# Patient Record
Sex: FEMALE | Race: WHITE | Employment: PART TIME | URBAN - METROPOLITAN AREA
[De-identification: names, ages, dates, MRNs, and addresses within clinical notes are randomized per-mention and may not be internally consistent; named-entity substitution may affect disease eponyms.]

---

## 2019-11-08 ENCOUNTER — AMBULATORY - RIVER FALLS (OUTPATIENT)
Dept: FAMILY MEDICINE | Facility: CLINIC | Age: 69
End: 2019-11-08

## 2022-02-16 NOTE — TELEPHONE ENCOUNTER
---------------------  From: Delmi Acevedo   To: Tad Chavis MD;     Sent: 11/7/2019 10:09:05 AM CST  Subject: Referral Management     Patient has a home sleep study uploaded into [employer] Ready for interpretation.

## 2022-10-17 ENCOUNTER — TRANSFERRED RECORDS (OUTPATIENT)
Dept: HEALTH INFORMATION MANAGEMENT | Facility: CLINIC | Age: 72
End: 2022-10-17

## 2022-10-17 LAB
ALT SERPL-CCNC: 17 U/L
AST SERPL-CCNC: 24 U/L (ref 14–36)
CHOLESTEROL (EXTERNAL): 266 MG/DL (ref 0–200)
CREATININE (EXTERNAL): 0.9 MG/DL (ref 0.7–1.4)
GLUCOSE (EXTERNAL): 92 MG/DL (ref 60–99)
HDLC SERPL-MCNC: 78 MG/DL (ref 35–65)
LDL CHOLESTEROL CALCULATED (EXTERNAL): 156 MG/DL (ref 0–130)
POTASSIUM (EXTERNAL): 4.2 MMOL/L (ref 3.5–5.1)
TRIGLYCERIDES (EXTERNAL): 158 MG/DL (ref 0–200)

## 2023-05-30 ENCOUNTER — MEDICAL CORRESPONDENCE (OUTPATIENT)
Dept: HEALTH INFORMATION MANAGEMENT | Facility: CLINIC | Age: 73
End: 2023-05-30
Payer: COMMERCIAL

## 2023-05-30 ENCOUNTER — TRANSFERRED RECORDS (OUTPATIENT)
Dept: HEALTH INFORMATION MANAGEMENT | Facility: CLINIC | Age: 73
End: 2023-05-30
Payer: COMMERCIAL

## 2023-06-19 ENCOUNTER — OFFICE VISIT (OUTPATIENT)
Dept: CARDIOLOGY | Facility: CLINIC | Age: 73
End: 2023-06-19
Payer: MEDICARE

## 2023-06-19 VITALS
DIASTOLIC BLOOD PRESSURE: 76 MMHG | HEIGHT: 64 IN | BODY MASS INDEX: 26.63 KG/M2 | WEIGHT: 156 LBS | SYSTOLIC BLOOD PRESSURE: 136 MMHG | HEART RATE: 68 BPM

## 2023-06-19 DIAGNOSIS — R94.31 ABNORMAL ELECTROCARDIOGRAM: Primary | ICD-10-CM

## 2023-06-19 DIAGNOSIS — G47.33 OSA (OBSTRUCTIVE SLEEP APNEA): ICD-10-CM

## 2023-06-19 PROCEDURE — 99204 OFFICE O/P NEW MOD 45 MIN: CPT | Performed by: INTERNAL MEDICINE

## 2023-06-19 RX ORDER — KETOROLAC TROMETHAMINE 5 MG/ML
1 SOLUTION OPHTHALMIC 4 TIMES DAILY
COMMUNITY

## 2023-06-19 RX ORDER — MOXIFLOXACIN 5 MG/ML
1 SOLUTION/ DROPS OPHTHALMIC 4 TIMES DAILY
COMMUNITY

## 2023-06-19 RX ORDER — EPINEPHRINE 0.3 MG/.3ML
0.3 INJECTION SUBCUTANEOUS
COMMUNITY
Start: 2022-10-17

## 2023-06-19 RX ORDER — PREDNISOLONE ACETATE 10 MG/ML
1 SUSPENSION/ DROPS OPHTHALMIC 4 TIMES DAILY
COMMUNITY

## 2023-06-19 NOTE — PROGRESS NOTES
[facility]   [address]  [place] [address]   [email or web address]   OFFICE: [phone]     CARDIOLOGY CLINIC NOTE     Thank you, Jennifer Corcoran, for asking the [facility] Heart Care team to see Ms. Annia Brasher to evaluate Consult         Assessment/Recommendations   Assessment:    1. Abnormal ECG - with right axis deviation. This is incidentally noted on a pre-op ECG. Annia has no current cardiorespiratory symptoms but does have mild untreated KIT.   2. KIT - mild as of 11/2019. Not currently treated.   3. Hyperlipidemia - with CAC of 0. I don't think she needs aggressive lipid management with her CAC.    Plan:  1. Evaluated cardiac size/function with an echocardiogram, looking at RV function and screening for pulmonary hypertension.  2. Encouraged regular exercise  3. Follow up as needed pending echo results.         History of Present Illness   Ms. Annia Brasher is a 72 year old female with a significant past history of hyperlipidemia who presents for evaluation of an abnormal ECG.    Mrs. Brasher recently had an EKG performed as part of a routine preoperative exam prior to cataract surgery.  The EKG demonstrated right axis deviation but was otherwise normal.  She is generally a very active person and gets intermittent aerobic exercise.  She denies any significant cardiorespiratory exertional symptoms.  She also denies palpitations.  She does have mild obstructive sleep apnea which was diagnosed in November 2019.  This is currently untreated.    Other than noted above, Ms. Brasher denies any chest pain/pressure/tightness, shortness of breath at rest or with exertion, light headedness/dizziness, pre-syncope, syncope, lower extremity swelling, palpitations, paroxysmal nocturnal dyspnea (PND), or orthopnea.     Cardiac Problems and Cardiac Diagnostics     Most Recent Cardiac testing:  ECG dated 5/30/23 (personaly reviewed and  "interpreted): mild sinus bradycardia (HR 56) with right axis deviation    CT cardiac calcium score 11/8/22  The patient's total coronary artery calcium score is 0  using the Agatston scoring method.        The estimated probability of a non-zero calcium score for a female age 70 is 59 %.          Medications  Allergies   Current Outpatient Medications   Medication Sig Dispense Refill     EPINEPHrine (ANY BX GENERIC EQUIV) 0.3 MG/0.3ML injection 2-pack Inject 0.3 mLs into the muscle once as needed for anaphylaxis       ketorolac (ACULAR) 0.5 % ophthalmic solution Place 1 drop into the right eye 4 times daily       moxifloxacin (VIGAMOX) 0.5 % ophthalmic solution Place 1 drop into the right eye 4 times daily       prednisoLONE acetate (PRED FORTE) 1 % ophthalmic suspension Place 1 drop into the right eye 4 times daily        Allergies   Allergen Reactions     Bees Anaphylaxis     Sulfa Antibiotics Rash        Physical Examination Review of Systems   Vitals: /76 (BP Location: Left arm, Patient Position: Sitting, Cuff Size: Adult Regular)   Pulse 68   Ht 1.626 m (5' 4\")   Wt 70.8 kg (156 lb)   BMI 26.78 kg/m    BMI= Body mass index is 26.78 kg/m .  Wt Readings from Last 3 Encounters:   06/19/23 70.8 kg (156 lb)       General: pleasant female. No acute distress.   HENT: external ears normal. Nares patent. Mucous membranes moist.  Eyes: perrla, extraocular muscles intact. No scleral icterus.   Neck: No JVD  Lungs: clear to auscultation  COR: regular rate and rhythm, No murmurs, rubs, or gallops  Abd: nondistended, BS present  Extrem: No edema        Please refer above for cardiac ROS details.       Past History   Past Medical History:   Past Medical History:   Diagnosis Date     Bilateral sensorineural hearing loss      KIT (obstructive sleep apnea)      Osteoarthritis         Past Surgical History:   Past Surgical History:   Procedure Laterality Date     COLONOSCOPY       EGD          Family History:   Family " History   Problem Relation Age of Onset     Alzheimer Disease Mother      Hypertension Mother      Heart Failure Father      Coronary Artery Disease Father         Social History:   Social History     Socioeconomic History     Marital status:      Spouse name: Not on file     Number of children: Not on file     Years of education: Not on file     Highest education level: Not on file   Occupational History     Not on file   Tobacco Use     Smoking status: Never     Smokeless tobacco: Never   Vaping Use     Vaping status: Not on file   Substance and Sexual Activity     Alcohol use: Not on file     Drug use: Never     Sexual activity: Not on file   Other Topics Concern     Not on file   Social History Narrative     Not on file     Social Determinants of Health     Financial Resource Strain: Not on file   Food Insecurity: Not on file   Transportation Needs: Not on file   Physical Activity: Not on file   Stress: Not on file   Social Connections: Not on file   Intimate Partner Violence: Not on file   Housing Stability: Not on file            Lab Results    Chemistry/lipid CBC Cardiac Enzymes/BNP/TSH/INR   No results found for: CHOL, HDL, TRIG, CHOLHDL, CREATININE, BUN, NA, CO2   Lipid panel at [facility] on 10/17/22  Total Chol - 266  HDL - 78  LDL - 156  Trigs - 158   No results found for: WBC, HGB, HCT, MCV, PLT No results found for: CKTOTAL, CKMB, TROPONINI, BNP, TSH, INR

## 2023-06-19 NOTE — LETTER
6/19/2023    Jennifer Green MD  [facility] [address] [place] [id number]    RE: Annia Brasher       Dear Colleague,     I had the pleasure of seeing Annia Brasher in the [facility].    [facility]   [address]  [place] [address]   [email or web address]   OFFICE: [phone]     CARDIOLOGY CLINIC NOTE     Thank you, Dr. Green, Jennifer PEPE, for asking the [facility] Heart Care team to see Ms. Annia Brasher to evaluate Consult         Assessment/Recommendations   Assessment:    Abnormal ECG - with right axis deviation. This is incidentally noted on a pre-op ECG. Annia has no current cardiorespiratory symptoms but does have mild untreated KIT.   KIT - mild as of 11/2019. Not currently treated.   Hyperlipidemia - with CAC of 0. I don't think she needs aggressive lipid management with her CAC.    Plan:  Evaluated cardiac size/function with an echocardiogram, looking at RV function and screening for pulmonary hypertension.  Encouraged regular exercise  Follow up as needed pending echo results.         History of Present Illness   Ms. Annia Brasher is a 72 year old female with a significant past history of hyperlipidemia who presents for evaluation of an abnormal ECG.    Mrs. Brasher recently had an EKG performed as part of a routine preoperative exam prior to cataract surgery.  The EKG demonstrated right axis deviation but was otherwise normal.  She is generally a very active person and gets intermittent aerobic exercise.  She denies any significant cardiorespiratory exertional symptoms.  She also denies palpitations.  She does have mild obstructive sleep apnea which was diagnosed in November 2019.  This is currently untreated.    Other than noted above, Ms. Brasher denies any chest pain/pressure/tightness, shortness of breath at rest or with exertion, light headedness/dizziness, pre-syncope, syncope, lower  "extremity swelling, palpitations, paroxysmal nocturnal dyspnea (PND), or orthopnea.     Cardiac Problems and Cardiac Diagnostics     Most Recent Cardiac testing:  ECG dated 5/30/23 (personaly reviewed and interpreted): mild sinus bradycardia (HR 56) with right axis deviation    CT cardiac calcium score 11/8/22  The patient's total coronary artery calcium score is 0  using the Agatston scoring method.        The estimated probability of a non-zero calcium score for a female age 70 is 59 %.          Medications  Allergies   Current Outpatient Medications   Medication Sig Dispense Refill    EPINEPHrine (ANY BX GENERIC EQUIV) 0.3 MG/0.3ML injection 2-pack Inject 0.3 mLs into the muscle once as needed for anaphylaxis      ketorolac (ACULAR) 0.5 % ophthalmic solution Place 1 drop into the right eye 4 times daily      moxifloxacin (VIGAMOX) 0.5 % ophthalmic solution Place 1 drop into the right eye 4 times daily      prednisoLONE acetate (PRED FORTE) 1 % ophthalmic suspension Place 1 drop into the right eye 4 times daily        Allergies   Allergen Reactions    Bees Anaphylaxis    Sulfa Antibiotics Rash        Physical Examination Review of Systems   Vitals: /76 (BP Location: Left arm, Patient Position: Sitting, Cuff Size: Adult Regular)   Pulse 68   Ht 1.626 m (5' 4\")   Wt 70.8 kg (156 lb)   BMI 26.78 kg/m    BMI= Body mass index is 26.78 kg/m .  Wt Readings from Last 3 Encounters:   06/19/23 70.8 kg (156 lb)       General: pleasant female. No acute distress.   HENT: external ears normal. Nares patent. Mucous membranes moist.  Eyes: perrla, extraocular muscles intact. No scleral icterus.   Neck: No JVD  Lungs: clear to auscultation  COR: regular rate and rhythm, No murmurs, rubs, or gallops  Abd: nondistended, BS present  Extrem: No edema        Please refer above for cardiac ROS details.       Past History   Past Medical History:   Past Medical History:   Diagnosis Date    Bilateral sensorineural hearing loss  "    KIT (obstructive sleep apnea)     Osteoarthritis         Past Surgical History:   Past Surgical History:   Procedure Laterality Date    COLONOSCOPY      EGD          Family History:   Family History   Problem Relation Age of Onset    Alzheimer Disease Mother     Hypertension Mother     Heart Failure Father     Coronary Artery Disease Father         Social History:   Social History     Socioeconomic History    Marital status:      Spouse name: Not on file    Number of children: Not on file    Years of education: Not on file    Highest education level: Not on file   Occupational History    Not on file   Tobacco Use    Smoking status: Never    Smokeless tobacco: Never   Vaping Use    Vaping status: Not on file   Substance and Sexual Activity    Alcohol use: Not on file    Drug use: Never    Sexual activity: Not on file   Other Topics Concern    Not on file   Social History Narrative    Not on file     Social Determinants of Health     Financial Resource Strain: Not on file   Food Insecurity: Not on file   Transportation Needs: Not on file   Physical Activity: Not on file   Stress: Not on file   Social Connections: Not on file   Intimate Partner Violence: Not on file   Housing Stability: Not on file            Lab Results    Chemistry/lipid CBC Cardiac Enzymes/BNP/TSH/INR   No results found for: CHOL, HDL, TRIG, CHOLHDL, CREATININE, BUN, NA, CO2   Lipid panel at [facility] on 10/17/22  Total Chol - 266  HDL - 78  LDL - 156  Trigs - 158   No results found for: WBC, HGB, HCT, MCV, PLT No results found for: CKTOTAL, CKMB, TROPONINI, BNP, TSH, INR             Thank you for allowing me to participate in the care of your patient.      Sincerely,     Jose R Quan MD     [facility]  cc:   Jennifer Green MD  [facility]  [address] [place] [id number]

## 2023-06-19 NOTE — PATIENT INSTRUCTIONS
It was a pleasure to meet with you today.      Below is a summary of your visit.   Your ECG is mildly abnormal. It is probably not anything to worry about but we will evaluate it with an echocardiogram.   I encourage regular exercise as tolerated. The more you do, the better.  Follow up pending abnormal echo results.    Exercise:  Recommendation: Regularly exercise  -Goal for 30 to 60 minutes of moderate-intensity aerobic activity, 7 days per week (minimum 5 days per week)  -Start at a duration and intensity of exercise that is comfortable for you.  -Increase by 5 minutes every other week until goal is reached  -Eventually, the intensity of exercise should be at a level at which it is difficult to carry on a normal conversation    We will call you to inform you of your test or procedure results within 3 business days of the test being performed.  If you do not hear from our office with the test results within 1 week please do not hesitate to call asking for these results.     Please do not hesitate to call the [facility] with any questions or concerns at [phone].     Sincerely,

## 2023-06-20 ENCOUNTER — ANCILLARY PROCEDURE (OUTPATIENT)
Dept: CARDIOLOGY | Facility: CLINIC | Age: 73
End: 2023-06-20
Attending: INTERNAL MEDICINE
Payer: MEDICARE

## 2023-06-20 DIAGNOSIS — R94.31 ABNORMAL ELECTROCARDIOGRAM: ICD-10-CM

## 2023-06-20 DIAGNOSIS — G47.33 OSA (OBSTRUCTIVE SLEEP APNEA): ICD-10-CM

## 2023-06-20 LAB — LVEF ECHO: NORMAL

## 2023-06-20 PROCEDURE — 93306 TTE W/DOPPLER COMPLETE: CPT | Performed by: INTERNAL MEDICINE

## 2023-08-13 ENCOUNTER — HEALTH MAINTENANCE LETTER (OUTPATIENT)
Age: 73
End: 2023-08-13

## 2024-10-05 ENCOUNTER — HEALTH MAINTENANCE LETTER (OUTPATIENT)
Age: 74
End: 2024-10-05

## 2025-08-31 ENCOUNTER — HEALTH MAINTENANCE LETTER (OUTPATIENT)
Age: 75
End: 2025-08-31